# Patient Record
(demographics unavailable — no encounter records)

---

## 2025-06-12 NOTE — SOCIAL HISTORY
[House] : [unfilled] lives in a house  [Central Forced Air] : heating provided by central forced air [Central] : air conditioning provided by central unit [Bedroom] :  in bedroom [Basement] :  in basement  [Living Area] : in living area [Dog] : dog

## 2025-06-12 NOTE — HISTORY OF PRESENT ILLNESS
[de-identified] : Roxanne Pratt is a 21-year-old female, previously a patient of Dr. Rasheed, who has been consulting him for allergies since her early childhood. Each time she returns home, she experiences severe congestion that can make breathing difficult. To manage her symptoms, she utilizes Afrin nasal spray three times daily and takes Sudafed tablets twice a day, continuing this regimen until she departs. She visits home once a month for a few days, and this issue has persisted for three years. She does not experience these symptoms while at school or at a friend's house. Although she owns a dog, she denies any allergic reactions to it. When they relocated in 2018, the carpet in one bedroom was replaced, but the others remained unchanged, allowing the dog to roam freely throughout the house. The congestion typically begins within a few days of her return from school. Allergy testing conducted by Dr. Rasheed revealed allergies to pollen, sulfates, dogs, and grass. When she is focused on school, she remains symptom-free and does not require allergy medication, experiencing only occasional sneezing.

## 2025-06-12 NOTE — HISTORY OF PRESENT ILLNESS
[de-identified] : Roxanne Pratt is a 21-year-old female, previously a patient of Dr. Rasheed, who has been consulting him for allergies since her early childhood. Each time she returns home, she experiences severe congestion that can make breathing difficult. To manage her symptoms, she utilizes Afrin nasal spray three times daily and takes Sudafed tablets twice a day, continuing this regimen until she departs. She visits home once a month for a few days, and this issue has persisted for three years. She does not experience these symptoms while at school or at a friend's house. Although she owns a dog, she denies any allergic reactions to it. When they relocated in 2018, the carpet in one bedroom was replaced, but the others remained unchanged, allowing the dog to roam freely throughout the house. The congestion typically begins within a few days of her return from school. Allergy testing conducted by Dr. Rasheed revealed allergies to pollen, sulfates, dogs, and grass. When she is focused on school, she remains symptom-free and does not require allergy medication, experiencing only occasional sneezing.